# Patient Record
Sex: FEMALE | Race: WHITE | NOT HISPANIC OR LATINO | URBAN - METROPOLITAN AREA
[De-identification: names, ages, dates, MRNs, and addresses within clinical notes are randomized per-mention and may not be internally consistent; named-entity substitution may affect disease eponyms.]

---

## 2023-06-16 ENCOUNTER — OFFICE VISIT (OUTPATIENT)
Dept: URBAN - METROPOLITAN AREA CLINIC 96 | Facility: CLINIC | Age: 73
End: 2023-06-16

## 2023-06-28 ENCOUNTER — OFFICE VISIT (OUTPATIENT)
Dept: URBAN - METROPOLITAN AREA CLINIC 98 | Facility: CLINIC | Age: 73
End: 2023-06-28
Payer: MEDICARE

## 2023-06-28 ENCOUNTER — WEB ENCOUNTER (OUTPATIENT)
Dept: URBAN - METROPOLITAN AREA CLINIC 98 | Facility: CLINIC | Age: 73
End: 2023-06-28

## 2023-06-28 ENCOUNTER — TELEPHONE ENCOUNTER (OUTPATIENT)
Dept: URBAN - METROPOLITAN AREA CLINIC 98 | Facility: CLINIC | Age: 73
End: 2023-06-28

## 2023-06-28 ENCOUNTER — LAB OUTSIDE AN ENCOUNTER (OUTPATIENT)
Dept: URBAN - METROPOLITAN AREA CLINIC 98 | Facility: CLINIC | Age: 73
End: 2023-06-28

## 2023-06-28 VITALS
WEIGHT: 106.2 LBS | TEMPERATURE: 98.4 F | DIASTOLIC BLOOD PRESSURE: 87 MMHG | BODY MASS INDEX: 22.91 KG/M2 | HEART RATE: 85 BPM | HEIGHT: 57 IN | SYSTOLIC BLOOD PRESSURE: 150 MMHG

## 2023-06-28 DIAGNOSIS — Z86.010 HISTORY OF COLON POLYPS: ICD-10-CM

## 2023-06-28 DIAGNOSIS — K44.9 HIATAL HERNIA: ICD-10-CM

## 2023-06-28 DIAGNOSIS — K64.4 EXTERNAL HEMORRHOIDS: ICD-10-CM

## 2023-06-28 DIAGNOSIS — K22.2 SCHATZKI'S RING: ICD-10-CM

## 2023-06-28 PROBLEM — 235623002: Status: ACTIVE | Noted: 2023-06-28

## 2023-06-28 PROBLEM — 428283002: Status: ACTIVE | Noted: 2023-06-28

## 2023-06-28 PROCEDURE — 99204 OFFICE O/P NEW MOD 45 MIN: CPT

## 2023-06-28 RX ORDER — SODIUM, POTASSIUM,MAG SULFATES 17.5-3.13G
177 ML SOLUTION, RECONSTITUTED, ORAL ORAL
Qty: 1 KIT | Refills: 0 | OUTPATIENT
Start: 2023-06-28 | End: 2023-06-29

## 2023-06-28 RX ORDER — HYDROCORTISONE ACETATE 25 MG/1
1 SUPPOSITORY SUPPOSITORY RECTAL ONCE A DAY
Qty: 10 | Refills: 0 | OUTPATIENT
Start: 2023-06-28 | End: 2023-07-08

## 2023-06-28 NOTE — PHYSICAL EXAM GASTROINTESTINAL
Abdomen, soft, nontender, nondistended , no masses palpable , Rectal 2 non thrombosed external hemorrhoids on exam, no rectal bleeding or masses

## 2023-06-28 NOTE — HPI-TODAY'S VISIT:
Patient is a 73 year old female who presents to discuss colonoscopy screening  Colonoscopy completed on 9/14/2016.  Findings included 2 mm polyp in the transverse colon.  Rare sigmoid diverticulosis.  Anal papillary were hypertrophied examination was otherwise normal.  Pathology results revealed tubular adenoma and it was recommended repeat in 5 years.   Of note patient also had upper endoscopy completed 3/8/2017 in setting of heme positive stool.  Found to have a nonobstructing Schatzki's ring found in the lower third of the esophagus with slight resistance on passage.  Small hiatal hernia.  No specimens were collected.  Having a BM 3 times a day- on looser side but denies diarrhea. "I have always been this way"  Denies BRBPR or melena  Does have rectal itching and believes she has a skin tag  Denies unintentional weight loss Denies heartburn or indigestions Does burp frequently- has also noticed that she can feel "things come up in her throat." Gives the example so f water. Also felt steak "not go down great" the other say  Denies choking or vomiting episodes

## 2023-07-14 PROBLEM — 84089009: Status: ACTIVE | Noted: 2023-07-14

## 2023-08-07 ENCOUNTER — OFFICE VISIT (OUTPATIENT)
Dept: URBAN - METROPOLITAN AREA SURGERY CENTER 18 | Facility: SURGERY CENTER | Age: 73
End: 2023-08-07

## 2023-08-07 ENCOUNTER — CLAIMS CREATED FROM THE CLAIM WINDOW (OUTPATIENT)
Dept: URBAN - METROPOLITAN AREA CLINIC 4 | Facility: CLINIC | Age: 73
End: 2023-08-07
Payer: MEDICARE

## 2023-08-07 ENCOUNTER — WEB ENCOUNTER (OUTPATIENT)
Dept: URBAN - METROPOLITAN AREA CLINIC 96 | Facility: CLINIC | Age: 73
End: 2023-08-07

## 2023-08-07 ENCOUNTER — CLAIMS CREATED FROM THE CLAIM WINDOW (OUTPATIENT)
Dept: URBAN - METROPOLITAN AREA SURGERY CENTER 18 | Facility: SURGERY CENTER | Age: 73
End: 2023-08-07
Payer: MEDICARE

## 2023-08-07 DIAGNOSIS — K22.89 OTHER SPECIFIED DISEASE OF ESOPHAGUS: ICD-10-CM

## 2023-08-07 DIAGNOSIS — Z86.010 ADENOMAS PERSONAL HISTORY OF COLONIC POLYPS: ICD-10-CM

## 2023-08-07 DIAGNOSIS — K21.9 ACID REFLUX: ICD-10-CM

## 2023-08-07 DIAGNOSIS — K56.699 COLON STRICTURE: ICD-10-CM

## 2023-08-07 DIAGNOSIS — K21.9 GASTRO-ESOPHAGEAL REFLUX DISEASE WITHOUT ESOPHAGITIS: ICD-10-CM

## 2023-08-07 DIAGNOSIS — K22.2 ACQUIRED ESOPHAGEAL RING: ICD-10-CM

## 2023-08-07 PROBLEM — 300286002: Status: ACTIVE | Noted: 2023-08-07

## 2023-08-07 PROCEDURE — 88312 SPECIAL STAINS GROUP 1: CPT | Performed by: PATHOLOGY

## 2023-08-07 PROCEDURE — 45380 COLONOSCOPY AND BIOPSY: CPT | Performed by: INTERNAL MEDICINE

## 2023-08-07 PROCEDURE — 43239 EGD BIOPSY SINGLE/MULTIPLE: CPT | Performed by: INTERNAL MEDICINE

## 2023-08-07 PROCEDURE — 88305 TISSUE EXAM BY PATHOLOGIST: CPT | Performed by: PATHOLOGY

## 2023-08-07 PROCEDURE — G8907 PT DOC NO EVENTS ON DISCHARG: HCPCS | Performed by: INTERNAL MEDICINE

## 2023-08-07 PROCEDURE — 88313 SPECIAL STAINS GROUP 2: CPT | Performed by: PATHOLOGY

## 2023-08-07 RX ORDER — PANTOPRAZOLE SODIUM 40 MG/1
1 TABLET TABLET, DELAYED RELEASE ORAL TWICE A DAY
Qty: 60 TABLET | Refills: 1 | OUTPATIENT
Start: 2023-08-07

## 2023-08-14 ENCOUNTER — WEB ENCOUNTER (OUTPATIENT)
Dept: URBAN - METROPOLITAN AREA CLINIC 96 | Facility: CLINIC | Age: 73
End: 2023-08-14

## 2023-08-15 ENCOUNTER — WEB ENCOUNTER (OUTPATIENT)
Dept: URBAN - METROPOLITAN AREA CLINIC 98 | Facility: CLINIC | Age: 73
End: 2023-08-15

## 2023-09-08 ENCOUNTER — OFFICE VISIT (OUTPATIENT)
Dept: URBAN - METROPOLITAN AREA TELEHEALTH 2 | Facility: TELEHEALTH | Age: 73
End: 2023-09-08
Payer: MEDICARE

## 2023-09-08 ENCOUNTER — DASHBOARD ENCOUNTERS (OUTPATIENT)
Age: 73
End: 2023-09-08

## 2023-09-08 VITALS — HEIGHT: 57 IN | BODY MASS INDEX: 22.65 KG/M2 | WEIGHT: 105 LBS

## 2023-09-08 DIAGNOSIS — K64.9 ACUTE HEMORRHOID: ICD-10-CM

## 2023-09-08 DIAGNOSIS — K22.2 SCHATZKI'S RING: ICD-10-CM

## 2023-09-08 DIAGNOSIS — K44.9 HIATAL HERNIA: ICD-10-CM

## 2023-09-08 DIAGNOSIS — Z86.010 HISTORY OF COLON POLYPS: ICD-10-CM

## 2023-09-08 PROCEDURE — 99213 OFFICE O/P EST LOW 20 MIN: CPT | Performed by: PHYSICIAN ASSISTANT

## 2023-09-08 RX ORDER — PANTOPRAZOLE SODIUM 40 MG/1
1 TABLET TABLET, DELAYED RELEASE ORAL TWICE A DAY
Qty: 60 TABLET | Refills: 1 | Status: ACTIVE | COMMUNITY
Start: 2023-08-07

## 2023-09-08 RX ORDER — PANTOPRAZOLE SODIUM 40 MG/1
1 TABLET TABLET, DELAYED RELEASE ORAL ONCE A DAY
Qty: 90 TABLET | Refills: 3 | OUTPATIENT
Start: 2023-09-08

## 2023-09-08 NOTE — HPI-ZZZTODAY'S VISIT
Patient had upper endoscopy and colonoscopy on August 7, 2023.  Upper endoscopy showed 1 severe stenosis found at 30 cm from the incisors.  It was not traversed biopsies were taken.  There was some diffuse mild mucosal changes of nodularity in the entire esophagus.  Barium swallow was ordered.  Barium swallow showed distal esophageal obstruction with ingestion of the barium tablet suggesting a subtle stricture just proximal to the GE junction as well as cervical esophagus 6 mm focal outpouching at the right posterior lateral aspect compatible with diverticulum.  Colonoscopy showed anal stricture on digital rectal exam and a stricture at the ileocecal valve, diverticulosis and nonbleeding internal hemorrhoids.  Pathology on the upper endoscopy showed squamocolumnar mucosa with reflux type changes as well as squamous mucosa with basal cell hyperplasia and borderline increased intraepithelial eosinophils with 20 per high-power field.  Terminal ileum pathology showed no significant abnormality she was put on pantoprazole twice a day and advised for follow-up. She is swallowing fine - no recurrence of the discomfort or feeling of needing to have eructation at the same time - taking the Pantoprazole bid has helped a lot - sometimes forgets the night pill her food is going down well no emesis, no nausea BM are normal for her - at times a little loose but has always been this way no BRBPR or melena still having itching from hemorrhoid at night

## 2023-09-11 ENCOUNTER — WEB ENCOUNTER (OUTPATIENT)
Dept: URBAN - METROPOLITAN AREA CLINIC 98 | Facility: CLINIC | Age: 73
End: 2023-09-11

## 2024-08-26 ENCOUNTER — ERX REFILL RESPONSE (OUTPATIENT)
Dept: URBAN - METROPOLITAN AREA CLINIC 80 | Facility: CLINIC | Age: 74
End: 2024-08-26

## 2024-08-26 ENCOUNTER — TELEPHONE ENCOUNTER (OUTPATIENT)
Dept: URBAN - METROPOLITAN AREA CLINIC 80 | Facility: CLINIC | Age: 74
End: 2024-08-26

## 2024-08-26 RX ORDER — PANTOPRAZOLE SODIUM 40 MG/1
TAKE 1 TABLET BY MOUTH EVERY DAY FOR 90 DAYS TABLET, DELAYED RELEASE ORAL
Qty: 90 TABLET | Refills: 0 | OUTPATIENT

## 2024-08-26 RX ORDER — PANTOPRAZOLE SODIUM 40 MG/1
1 TABLET TABLET, DELAYED RELEASE ORAL ONCE A DAY
Qty: 90 TABLET | Refills: 3 | OUTPATIENT

## 2024-10-04 ENCOUNTER — OFFICE VISIT (OUTPATIENT)
Dept: URBAN - METROPOLITAN AREA TELEHEALTH 2 | Facility: TELEHEALTH | Age: 74
End: 2024-10-04
Payer: MEDICARE

## 2024-10-04 VITALS — WEIGHT: 103 LBS | BODY MASS INDEX: 22.22 KG/M2 | HEIGHT: 57 IN

## 2024-10-04 DIAGNOSIS — K64.4 EXTERNAL HEMORRHOIDS: ICD-10-CM

## 2024-10-04 DIAGNOSIS — K22.2 SCHATZKI'S RING: ICD-10-CM

## 2024-10-04 DIAGNOSIS — Z86.0100 HISTORY OF COLON POLYPS: ICD-10-CM

## 2024-10-04 DIAGNOSIS — K44.9 HIATAL HERNIA: ICD-10-CM

## 2024-10-04 PROCEDURE — 99213 OFFICE O/P EST LOW 20 MIN: CPT | Performed by: PHYSICIAN ASSISTANT

## 2024-10-04 RX ORDER — PANTOPRAZOLE SODIUM 40 MG/1
TAKE 1 TABLET BY MOUTH EVERY DAY FOR 90 DAYS TABLET, DELAYED RELEASE ORAL
Qty: 90 TABLET | Refills: 0 | Status: ACTIVE | COMMUNITY

## 2024-10-04 RX ORDER — PANTOPRAZOLE SODIUM 40 MG/1
1 TABLET TABLET, DELAYED RELEASE ORAL ONCE A DAY
Qty: 90 TABLET | Refills: 3 | OUTPATIENT

## 2024-10-04 NOTE — HPI-ZZZTODAY'S VISIT
Patient had upper endoscopy and colonoscopy on August 7, 2023.  Upper endoscopy showed 1 severe stenosis found at 30 cm from the incisors.  It was not traversed biopsies were taken.  There was some diffuse mild mucosal changes of nodularity in the entire esophagus.  Barium swallow was ordered.  Barium swallow showed distal esophageal obstruction with ingestion of the barium tablet suggesting a subtle stricture just proximal to the GE junction as well as cervical esophagus 6 mm focal outpouching at the right posterior lateral aspect compatible with diverticulum.  Colonoscopy showed anal stricture on digital rectal exam and a stricture at the ileocecal valve, diverticulosis and nonbleeding internal hemorrhoids.  Pathology on the upper endoscopy showed squamocolumnar mucosa with reflux type changes as well as squamous mucosa with basal cell hyperplasia and borderline increased intraepithelial eosinophils with 20 per high-power field.  Terminal ileum pathology showed no significant abnormality  Pt has been feeling great has not had any issues with dysphagia on Pantoprazole 40mg once a day no heartburn or indigestion no nausea or emesis no fevers or chills BM are wnl - no BRBPR or melena